# Patient Record
Sex: MALE | Race: WHITE | Employment: FULL TIME | URBAN - METROPOLITAN AREA
[De-identification: names, ages, dates, MRNs, and addresses within clinical notes are randomized per-mention and may not be internally consistent; named-entity substitution may affect disease eponyms.]

---

## 2024-07-30 ENCOUNTER — HOSPITAL ENCOUNTER (EMERGENCY)
Age: 19
Discharge: LEFT BEFORE DISCHARGE | End: 2024-07-30
Attending: EMERGENCY MEDICINE

## 2024-07-30 VITALS
WEIGHT: 101.4 LBS | OXYGEN SATURATION: 99 % | HEART RATE: 118 BPM | DIASTOLIC BLOOD PRESSURE: 82 MMHG | TEMPERATURE: 97.8 F | RESPIRATION RATE: 16 BRPM | SYSTOLIC BLOOD PRESSURE: 126 MMHG

## 2024-07-30 DIAGNOSIS — E10.65 HYPERGLYCEMIA DUE TO TYPE 1 DIABETES MELLITUS (HCC): Primary | ICD-10-CM

## 2024-07-30 LAB — GLUCOSE BLD-MCNC: 286 MG/DL (ref 74–99)

## 2024-07-30 PROCEDURE — 82962 GLUCOSE BLOOD TEST: CPT

## 2024-07-30 PROCEDURE — 99282 EMERGENCY DEPT VISIT SF MDM: CPT

## 2024-07-30 RX ORDER — 0.9 % SODIUM CHLORIDE 0.9 %
1000 INTRAVENOUS SOLUTION INTRAVENOUS ONCE
Status: DISCONTINUED | OUTPATIENT
Start: 2024-07-30 | End: 2024-07-30 | Stop reason: HOSPADM

## 2024-07-30 RX ORDER — MULTIVIT-MIN/IRON/FOLIC ACID/K 18-600-40
CAPSULE ORAL
COMMUNITY

## 2024-07-30 ASSESSMENT — ENCOUNTER SYMPTOMS: NAUSEA: 1

## 2024-07-30 ASSESSMENT — PAIN - FUNCTIONAL ASSESSMENT: PAIN_FUNCTIONAL_ASSESSMENT: NONE - DENIES PAIN

## 2024-07-30 NOTE — ED PROVIDER NOTES
[facility] EMERGENCY DEPARTMENT  EMERGENCY DEPARTMENT ENCOUNTER        Pt Name: Diaz Carlton  MRN: [id number]  Birthdate 2005  Date of evaluation: 7/30/2024  Provider: Bernabe Vicente DO  PCP: No primary care provider on file.  Note Started: 6:14 PM EDT 7/30/24    CHIEF COMPLAINT       Chief Complaint   Patient presents with    Hyperglycemia     Not feeling well for a few days. Poor PO intake.  last night and this morning. Took insulin. FSBS 286 in triage.        HISTORY OF PRESENT ILLNESS: 1 or more Elements     History from : Patient    Limitations to history : None    Diaz Carlton is a 19 y.o. male who presents for nausea and elevated blood sugar greater than 500.  Patient states his blood sugars been running high and his last blood sugar was 500.  Patient states he is a type 1 diabetes since he was a child.  He is not on long-acting insulin that he does not know the name of but is supposed to be taking 20 units at bedtime he states she often forgets.  Patient takes NovoLog sliding scale and mealtime and correction factor is 1 unit for every 50 mg/Azalia blood sugar above 150 patient denies any chest pain abdominal pain cough or cold symptoms fever chills he denies any urinary complaints denies any diarrhea.  He states he does not need any refills.  He states he follows with some clinic in [facility] that he cannot remember the name of and he states they give him his insulin there    Nursing Notes were all reviewed and agreed with or any disagreements were addressed in the HPI.    REVIEW OF SYSTEMS :      Review of Systems   Gastrointestinal:  Positive for nausea.       Positives and Pertinent negatives as per HPI.     SURGICAL HISTORY   No past surgical history on file.    CURRENTMEDICATIONS       Discharge Medication List as of 7/30/2024  6:28 PM        CONTINUE these medications which have NOT CHANGED    Details   Cholecalciferol (VITAMIN D) 50 MCG (2000 UT) CAPS